# Patient Record
(demographics unavailable — no encounter records)

---

## 2024-11-25 NOTE — PHYSICAL EXAM

## 2024-11-25 NOTE — HISTORY OF PRESENT ILLNESS
[Born at ___ Wks Gestation] : The patient was born at [unfilled] weeks gestation [] : via normal spontaneous [facility] [(1) _____] : [unfilled] [(5) _____] : [unfilled] [BW: _____] : weight of [unfilled] [G: ___] : G [unfilled] [P: ___] : P [unfilled] [NO] : No [Length: _____] : length of [unfilled] [Time of Birth: _____] : Time of birth was [unfilled] [RSV vaccine] : RSV vaccine not received by mother at least 14 days prior to delivery [Formula ___ oz/feed] : [unfilled] oz of formula per feed [Normal] : Normal [In Bassinet/Crib] : sleeps in bassinet/crib [On back] : sleeps on back [Pacifier] : Uses pacifier [No] : No cigarette smoke exposure [Rear facing car seat in back seat] : Rear facing car seat in back seat [Carbon Monoxide Detectors] : Carbon monoxide detectors at home [FreeTextEntry1] : RICKY ADAMS is a 0 month here for well

## 2024-11-25 NOTE — HISTORY OF PRESENT ILLNESS
[Born at ___ Wks Gestation] : The patient was born at [unfilled] weeks gestation [] : via normal spontaneous vaginal delivery [[facility]] : at [facility] [(1) _____] : [unfilled] [(5) _____] : [unfilled] [BW: _____] : weight of [unfilled] [G: ___] : G [unfilled] [P: ___] : P [unfilled] [NO] : No [Length: _____] : length of [unfilled] [Time of Birth: _____] : Time of birth was [unfilled] [RSV vaccine] : RSV vaccine not received by mother at least 14 days prior to delivery [Formula ___ oz/feed] : [unfilled] oz of formula per feed [Normal] : Normal [In Bassinet/Crib] : sleeps in bassinet/crib [On back] : sleeps on back [Pacifier] : Uses pacifier [No] : No cigarette smoke exposure [Rear facing car seat in back seat] : Rear facing car seat in back seat [Carbon Monoxide Detectors] : Carbon monoxide detectors at home [FreeTextEntry1] : RICKY ADAMS is a 0 month here for well

## 2024-11-25 NOTE — PHYSICAL EXAM

## 2024-11-25 NOTE — DISCUSSION/SUMMARY
[] : The components of the vaccine(s) to be administered today are listed in the plan of care. The disease(s) for which the vaccine(s) are intended to prevent and the risks have been discussed with the caretaker.  The risks are also included in the appropriate vaccination information statements which have been provided to the patient's caregiver.  The caregiver has given consent to vaccinate. [FreeTextEntry1] : Recommend exclusive breastfeeding, 8-12 feedings per day. Mother should continue prenatal vitamins and avoid alcohol. If formula is needed, recommend iron-fortified formulations every 2-3 hrs. When in car, patient should be in rear-facing car seat in back seat. Air dry umbillical stump. Put baby to sleep on back, in own crib with no loose or soft bedding. Limit baby's exposure to others, especially those with fever or unknown vaccine status. f/u in 1 week  instruction re formula. must refrigerate open bottles of formula, no warming in microwave.

## 2024-12-05 NOTE — DISCUSSION/SUMMARY
[FreeTextEntry1] : Poor weight gain, reviewed with mother how to mix formula. advised to slowly increased feeds, reviewed sleep, Mother was using blanket for infant. Advised no blankets no toys no bumpers,  Infant may be swaddled or in a sleep sack, no loose bedding.  f/u in 1 week. for weight check

## 2024-12-05 NOTE — HISTORY OF PRESENT ILLNESS
[FreeTextEntry6] : doing well, feeding enfamil 2oz q 2 hrs . wet diapers Mother is using powder formula

## 2024-12-12 NOTE — DISCUSSION/SUMMARY
[FreeTextEntry1] : continue feeding as tolerated , increase prn' f/u i 10-12 days for 1 month visit

## 2025-01-06 NOTE — PHYSICAL EXAM

## 2025-01-06 NOTE — HISTORY OF PRESENT ILLNESS
[Mother] : mother [Formula ___ oz/feed] : [unfilled] oz of formula per feed [Normal] : Normal [No] : No cigarette smoke exposure [Rear facing car seat in back seat] : Rear facing car seat in back seat [Carbon Monoxide Detectors] : Carbon monoxide detectors at home [Smoke Detectors] : Smoke detectors at home. [NO] : No [FreeTextEntry1] : RICKY ADAMS is a 1 month here for well

## 2025-01-06 NOTE — DISCUSSION/SUMMARY
[] : The components of the vaccine(s) to be administered today are listed in the plan of care. The disease(s) for which the vaccine(s) are intended to prevent and the risks have been discussed with the caretaker.  The risks are also included in the appropriate vaccination information statements which have been provided to the patient's caregiver.  The caregiver has given consent to vaccinate. [FreeTextEntry1] : Recommend exclusive breastfeeding, 8-12 feedings per day. Mother should continue prenatal vitamins and avoid alcohol. If formula is needed, recommend iron-fortified formulations, 2-4 oz every 2-3 hrs. When in car, patient should be in rear-facing car seat in back seat. Put baby to sleep on back, in own crib with no loose or soft bedding. Help baby to develop sleep and feeding routines. May offer pacifier if needed. Start tummy time when awake. Limit baby's exposure to others, especially those with fever or unknown vaccine status. Parents counseled to call if rectal temperature >100.4 degrees F. well child visit in 3 weeks continue vitamins

## 2025-02-03 NOTE — PHYSICAL EXAM
[Alert] : alert [Acute Distress] : no acute distress [Normocephalic] : normocephalic [Flat Open Anterior [place]] : flat open anterior fontanelle [PERRL] : PERRL [Red Reflex Bilateral] : red reflex bilateral [Normally Placed Ears] : normally placed ears [Auricles Well Formed] : auricles well formed [Clear Tympanic membranes] : clear tympanic membranes [Light reflex present] : light reflex present [Bony landmarks visible] : bony landmarks visible [Discharge] : no discharge [Nares Patent] : nares patent [Palate Intact] : palate intact [Uvula Midline] : uvula midline [Supple, full passive range of motion] : supple, full passive range of motion [Palpable Masses] : no palpable masses [Symmetric Chest Rise] : symmetric chest rise [Clear to Auscultation Bilaterally] : clear to auscultation bilaterally [Regular Rate and Rhythm] : regular rate and rhythm [S1, S2 present] : S1, S2 present [Murmurs] : no murmurs [+2 Femoral Pulses] : +2 femoral pulses [Soft] : soft [Tender] : nontender [Distended] : not distended [Bowel Sounds] : bowel sounds present [Hepatomegaly] : no hepatomegaly [Splenomegaly] : no splenomegaly [Normal external genitailia] : normal external genitalia [Clitoromegaly] : no clitoromegaly [Patent Vagina] : vagina patent [Normally Placed] : normally placed [No Abnormal Lymph Nodes Palpated] : no abnormal lymph nodes palpated [Barth-Ortolani] : negative Barth-Ortolani [Symmetric Flexed Extremities] : symmetric flexed extremities [Spinal Dimple] : no spinal dimple [Tuft of Hair] : no tuft of hair [Startle Reflex] : startle reflex present [Suck Reflex] : suck reflex present [Rooting] : rooting reflex present [Palmar Grasp] : palmar grasp reflex present [Plantar Grasp] : plantar grasp reflex present [Symmetric Isak] : symmetric [place] [Rash and/or lesion present] : no rash/lesion

## 2025-02-03 NOTE — DEVELOPMENTAL MILESTONES
[Normal Development] : Normal Development [Smiles responsively] : smiles responsively [Vocalizes with simple cooing] : vocalizes with simple cooing [Lifts head and chest in prone] : lifts head and chest in prone [Opens and shuts hands] : opens and shuts hands [Passed] : passed [Yes] : Completed.

## 2025-02-03 NOTE — DISCUSSION/SUMMARY
[] : The components of the vaccine(s) to be administered today are listed in the plan of care. The disease(s) for which the vaccine(s) are intended to prevent and the risks have been discussed with the caretaker.  The risks are also included in the appropriate vaccination information statements which have been provided to the patient's caregiver.  The caregiver has given consent to vaccinate. [FreeTextEntry1] : Recommend exclusive breastfeeding, 8-12 feedings per day. Mother should continue prenatal vitamins and avoid alcohol. If formula is needed, recommend iron-fortified formulations, 2-4 oz every 3-4 hrs. When in car, patient should be in rear-facing car seat in back seat. Put baby to sleep on back, in own crib with no loose or soft bedding. Help baby to maintain sleep and feeding routines. May offer pacifier if needed. Continue tummy time when awake. Parents counseled to call if rectal temperature >100.4 degrees F. well child visit in 2 months

## 2025-02-03 NOTE — HISTORY OF PRESENT ILLNESS
[Mother] : mother [Formula ___ oz/feed] : [unfilled] oz of formula per feed [Normal] : Normal [In Bassinet/Crib] : sleeps in bassinet/crib [Pacifier use] : Pacifier use [No] : No cigarette smoke exposure [Carbon Monoxide Detectors] : Carbon monoxide detectors at home [Smoke Detectors] : Smoke detectors at home. [NO] : No [FreeTextEntry1] : RICKY ADAMS is a 2 month here for well

## 2025-04-28 NOTE — HISTORY OF PRESENT ILLNESS
[Mother] : mother [Formula ___ oz/feed] : [unfilled] oz of formula per feed [Normal] : Normal [In Bassinet/Crib] : sleeps in bassinet/crib [Tummy time] : tummy time [No] : No cigarette smoke exposure [NO] : No [Pacifier use] : not using pacifier [FreeTextEntry1] : RICKY ADAMS is a 5 month here for well

## 2025-04-28 NOTE — DEVELOPMENTAL MILESTONES
[Laughs aloud] : laughs aloud [Turns to voice] : turns to voice [Keeps hands unfisted] : keeps hands unfisted [Rolls over prone to supine] : does not roll over prone to supine [Passed] : passed [Yes] : Completed.

## 2025-04-28 NOTE — PHYSICAL EXAM
[Alert] : alert [Acute Distress] : no acute distress [Normocephalic] : normocephalic [Flat Open Anterior [place]] : flat open anterior fontanelle [Red Reflex] : red reflex bilateral [PERRL] : PERRL [Normally Placed Ears] : normally placed ears [Auricles Well Formed] : auricles well formed [Clear Tympanic membranes] : clear tympanic membranes [Light reflex present] : light reflex present [Bony landmarks visible] : bony landmarks visible [Discharge] : no discharge [Nares Patent] : nares patent [Palate Intact] : palate intact [Uvula Midline] : uvula midline [Palpable Masses] : no palpable masses [Symmetric Chest Rise] : symmetric chest rise [Clear to Auscultation Bilaterally] : clear to auscultation bilaterally [Regular Rate and Rhythm] : regular rate and rhythm [S1, S2 present] : S1, S2 present [Murmurs] : no murmurs [+2 Femoral Pulses] : (+) 2 femoral pulses [Soft] : soft [Tender] : nontender [Distended] : nondistended [Bowel Sounds] : bowel sounds present [Hepatomegaly] : no hepatomegaly [Splenomegaly] : no splenomegaly [Normal External Genitalia] : normal external genitalia [Clitoromegaly] : no clitoromegaly [Normal Vaginal Introitus] : normal vaginal introitus [Patent] : patent [Normally Placed] : normally placed [No Abnormal Lymph Nodes Palpated] : no abnormal lymph nodes palpated [Barth-Ortolani] : negative Barth-Ortolani [Allis Sign] : negative Allis sign [Spinal Dimple] : no spinal dimple [Tuft of Hair] : no tuft of hair [Startle Reflex] : startle reflex present [Plantar Grasp] : plantar grasp reflex present [Symmetric Isak] : symmetric isak [Rash or Lesions] : no rash/lesions

## 2025-05-14 NOTE — DISCUSSION/SUMMARY
Please notify results and/or plan below.  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        
Writer called and discussed lab results as outlined below by provider.  Patient verbalized understanding; no further questions at this time.   
[FreeTextEntry1] : Recommend breastfeeding, 8-12 feedings per day. Mother should continue prenatal vitamins and avoid alcohol. If formula is needed, recommend iron-fortified formulations, 2-4 oz every 3-4 hrs. Cereal may be introduced using a spoon and bowl. When in car, patient should be in rear-facing car seat in back seat. Put baby to sleep on back, in own crib with no loose or soft bedding. Lower crib matress. Help baby to maintain sleep and feeding routines. May offer pacifier if needed. Continue tummy time when awake. father refused vaccines while mother wanted the vaccines, spent time explaining the need to vaccinate.  also father told that we cannot continue to see patient if completely refuses vaccines going foreward.  discussed starting solid foods well child visit in 2 months and return for vaccines before.